# Patient Record
Sex: FEMALE | Race: ASIAN
[De-identification: names, ages, dates, MRNs, and addresses within clinical notes are randomized per-mention and may not be internally consistent; named-entity substitution may affect disease eponyms.]

---

## 2019-12-16 NOTE — MMO
Bilateral MAMMO Bilat Diag [facility].

 

CLINICAL HISTORY:

Patient is 49 years old and is seen for diagnostic exam. The patient has no

family history of breast cancer.  The patient has no personal history of cancer.

 

VIEWS:

The views performed were:  bilateral craniocaudal with tomosynthesis; bilateral

mediolateral oblique with tomosynthesis; and bilateral mediolateral with

tomosynthesis.

 

FILMS COMPARED:

The present examination has been compared to prior imaging studies performed at

[facility] on 07/12/2018, and at [facility] on

06/17/2016.

 

This study has been interpreted with the assistance of computer-aided detection.

 

MAMMOGRAM FINDINGS:

The breasts are heterogeneously dense, which could obscure a lesion on

mammography.

 

There are stable benign appearing calcifications seen in the right breast.

 

The patient reports that her physician felt an abnormality within the left

breast.  However, the patient is unable to palpate an abnormality and is unsure

where this palpable finding was located.  Thus, a follow up ultrasound could be

performed if this continues to be a concern on repeat physical examination.  If

so, the area should be marked on the patient's skin to direct focused ultrasound.

 

There are no suspicious masses, suspicious calcifications, or new areas of

architectural distortion.

 

IMPRESSION:

THERE IS NO MAMMOGRAPHIC EVIDENCE OF MALIGNANCY.

 

A ROUTINE FOLLOW-UP MAMMOGRAM IN 1 YEAR IS RECOMMENDED.

 

THE RESULTS OF THIS EXAM WERE SENT TO THE PATIENT.

 

ACR BI-RADS Category 2 - Benign finding

 

MAMMOGRAPHY NOTE:

1. A negative mammogram report should not delay a biopsy if a dominant of

clinically suspicious mass is present.

2. Approximately 10% to 15% of breast cancers are not detected by

mammography.

3. Adenosis and dense breasts may obscure an underlying neoplasm.

 

 

Reported by: NICHOLAS PARIKH MD

Electonically Signed: [id number]

## 2021-09-30 ENCOUNTER — HOSPITAL ENCOUNTER (OUTPATIENT)
Dept: HOSPITAL 92 - BICMRI | Age: 51
Discharge: HOME | End: 2021-09-30
Attending: PSYCHIATRY & NEUROLOGY
Payer: COMMERCIAL

## 2021-09-30 DIAGNOSIS — M54.30: Primary | ICD-10-CM

## 2021-09-30 DIAGNOSIS — M51.36: ICD-10-CM

## 2021-09-30 DIAGNOSIS — M47.816: ICD-10-CM

## 2021-09-30 DIAGNOSIS — M51.37: ICD-10-CM

## 2021-09-30 PROCEDURE — 72148 MRI LUMBAR SPINE W/O DYE: CPT

## 2022-09-12 ENCOUNTER — HOSPITAL ENCOUNTER (OUTPATIENT)
Dept: HOSPITAL 92 - BICMAMMO | Age: 52
Discharge: HOME | End: 2022-09-12
Attending: FAMILY MEDICINE
Payer: COMMERCIAL

## 2022-09-12 DIAGNOSIS — Z12.31: Primary | ICD-10-CM

## 2022-09-12 PROCEDURE — 77063 BREAST TOMOSYNTHESIS BI: CPT

## 2022-09-12 PROCEDURE — 77067 SCR MAMMO BI INCL CAD: CPT
